# Patient Record
(demographics unavailable — no encounter records)

---
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
## 2025-01-26 NOTE — DATA REVIEWED
[MRI] : MRI [Lumbar Spine] : lumbar spine [Report was reviewed and noted in the chart] : The report was reviewed and noted in the chart [I independently reviewed and interpreted images and report] : I independently reviewed and interpreted images and report [FreeTextEntry1] : MRI @ [facility] - foraminal stenosis at L4-5-S1
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
## 2025-01-26 NOTE — ASSESSMENT
[FreeTextEntry1] : 57 yo M with cervical radiculopathy and lumbar pain without radiculopathy x years, worsening over the last few months without injury. XRs cervical spine without fractures, lesions or instability. Per patient hx of cervical HNP. MRI LS with some foraminal stenosis. no focal pathology, no surgery anticipated  - Start PT - refer to pm&r for continued nonsurgical care

## 2025-01-26 NOTE — IMAGING
[Straightening consistent with spasm] : Straightening consistent with spasm [de-identified] : CSPINE Palpation: no paraspinal tenderness, no spasm in traps, rhomboids, paracervicals ROM: pain when turning head side to side Motor: no focal deficit  Strength: 5/5 bilateral deltoid, biceps, triceps, wrist flexors, wrist extensors, , abductors Sensation I LT  Reflexes: Negative Del Rosario's bilaterally - Spurling  LSPINE Inspection: no defects, deformity Palpation: No tenderness or spasm in bilateral and lumbar paraspinal musculature ROM: Pain with all motion due to stiffness/diminished all planes Motor: no focal deficit  Strength: 5/5 bilateral hip flexors, knee extensors, ankle dorsiflexors, EHL, ankle plantarflexors Sensation I LT  - SLR B/L Tight hamstrings Toe and heal walking intact  Gait non antalgic, non myelopathic

## 2025-01-26 NOTE — IMAGING
[Straightening consistent with spasm] : Straightening consistent with spasm [de-identified] : CSPINE Palpation: no paraspinal tenderness, no spasm in traps, rhomboids, paracervicals ROM: pain when turning head side to side Motor: no focal deficit  Strength: 5/5 bilateral deltoid, biceps, triceps, wrist flexors, wrist extensors, , abductors Sensation I LT  Reflexes: Negative Del Rosario's bilaterally - Spurling  LSPINE Inspection: no defects, deformity Palpation: No tenderness or spasm in bilateral and lumbar paraspinal musculature ROM: Pain with all motion due to stiffness/diminished all planes Motor: no focal deficit  Strength: 5/5 bilateral hip flexors, knee extensors, ankle dorsiflexors, EHL, ankle plantarflexors Sensation I LT  - SLR B/L Tight hamstrings Toe and heal walking intact  Gait non antalgic, non myelopathic

## 2025-02-10 NOTE — DISCUSSION/SUMMARY
[de-identified] : SONYA RIBEIRO is a 58 year-old man presenting for a NPV for a history of chronic neck and low back pain.   Prior treatment: Physical therapy Tramadol Tizanidine Heat, rest, ice  Plan: 1) MRI cervical/lumbar spine images reviewed with the patient. 2) Schedule C7-T1 MADHURI w/o MAC. The procedure was explained to the patient in detail. Reviewed risks, benefits, and alternatives with the patient. Some risks discussed included temporary increase in pain, bleeding, infection, and side effects from steroids. The patient expressed understanding and would like to proceed. 3) Consider L5-S1 ILESI w/o MAC in the future 4) Continue tramadol prn through PCP - patient advised that we do not prescribe opioids 5) RTC post procedure

## 2025-02-10 NOTE — DATA REVIEWED
[Lumbar Spine] : lumbar spine [MRI] : MRI [Cervical Spine] : cervical spine [Report was reviewed and noted in the chart] : The report was reviewed and noted in the chart [I independently reviewed and interpreted images and report] : I independently reviewed and interpreted images and report [FreeTextEntry1] : 1/4/2025 MRI Lumbar Spine ([facility]) INTERPRETATION: MRI of the thoracic spine was performed on 02/18/2015. T12-L1 - L5-S1 disc degenerative changes are noted with loss of disc space height and signal,  anterior hypertrophic change, and anterior disc extension with Modic type II endplate signal  changes noted at L5-S1. Modic type II endplate signal changes are also suggested anteriorly at  L1-2. At L5-S1, disc herniation is noted with grade 1 retrolisthesis deforming the anterior epidural fat  abutting the proximal right S1 nerve root approaching the proximal left S1 nerve root with  bilateral neural foraminal extension abutting the exiting L5 nerve roots. At L4-5, disc herniation is noted deforming the thecal sac abutting the proximal L5 nerve roots  bilaterally with bilateral neural foraminal extension, right greater than left, abutting the exiting  right L4 nerve root. At L3-4, disc bulge is noted with bilateral inferior neural foraminal extension. At L2-3, disc bulge is noted with bilateral inferior neural foraminal extension. At L1-2, there is no evidence of herniated disc, midline thecal sac deformity, or neural foraminal  stenosis. At T12-L1, there is no evidence of herniated disc, spinal canal compromise, or neural foraminal  stenosis.  There is no evidence of lumbar vertebral body compression fracture. There is no evidence of  bone marrow infiltrative disorder. There is no evidence of spondylolisthesis. There is no  evidence of signal elevation within the conus medullaris which is located at the approximate  T12-L1 disc space level. Lumbar spine curvature is noted with leftward convexity. Nonspecific dependent soft tissue edema is noted within the posterior subcutaneous tissues. Examination is compared to previous MRI study of the lumbar spine dated 10/14/2022. There is  no significant interval change. IMPRESSION: - L4-5 and L5-S1 disc herniations with grade 1 retrolisthesis deforming the anterior  epidural fat abutting the proximal right S1 nerve root approaching the proximal left S1  nerve root, L4-5 abutment of the proximal L5 nerve roots bilaterally, L4-5 and L5-S1  bilateral neural foraminal extension abutting the exiting L5 nerve roots bilaterally at L5- S1 and abutting the exiting right L4 nerve root at L4-5. - L2-3 and L3-4 disc bulges with bilateral inferior neural foraminal extension. - Lumbar spine curvature with leftward convexity. - T12-L1 - L5-S1 disc degenerative changes. - No significant change compared to previous MRI study dated 10/14/2022.  10/28/2022 MRI Cervical Spine ([facility]) INTERPRETATION: At the C2-3 disc space level, a disc bulge is noted deforming the thecal  sac, contributing to mild central spinal stenosis in conjunction with posterior ligamentous  hypertrophy. There is no evidence of neural foraminal stenosis. There is loss of disc signal with  preservation of disc space height. At C3-4, disc herniation is noted deforming the thecal sac, contributing to moderate central spinal stenosis in conjunction with posterior ligamentous hypertrophy with bilateral neural  foraminal narrowing in conjunction with facet and uncinate hypertrophic changes. There is loss  of disc signal with preservation of disc space height. At C4-5, a disc bulge is noted deforming the thecal sac. Bilateral neural foraminal narrowing is  noted in conjunction with facet and uncinate hypertrophic changes. There is loss of disc signal  with preservation of disc space height. At C5-6, disc herniation is noted deforming the thecal sac. Bilateral neural foraminal narrowing  is noted in conjunction with facet and uncinate hypertrophic changes. There is loss of disc signal  with preservation of disc space height. At C6-7, disc herniation is noted deforming the thecal sac, abutting the spinal cord. Bilateral  neural foraminal narrowing is noted in conjunction with facet and uncinate hypertrophic  changes. There is loss of disc signal with preservation of disc space height with anterior disc  extension. At C7-T1, a left paracentral disc bulge is noted deforming the thecal sac. There is no evidence of  neural foraminal stenosis. Loss of disc signal is noted with preservation of disc space height. There is only limited assessment provided of the T1-2 - T3-4 disc space levels at the peripheral  margin of the included field of view. MRI study of the thoracic spine was previously performed  on 02/18/2015. Cervical spine straightening is noted, a nonspecific finding which meets criteria for muscle  spasm. C3 - C7 chronic wedge compression deformities are noted with approximately 50% loss of  height at C6 and approximately 20% loss of height at C3, C4, C5, and C7. There is no evidence of spondylolisthesis. There is no evidence of mass at the craniocervical  junction. There is no atlantoaxial subluxation. There is no prevertebral soft tissue edema. There  is no syringohydromyelia. Hemangioma is noted within the C7 vertebral body. Examination is compared to the previous MRI study dated 02/16/2015. C4-5 bilateral neural  foraminal narrowing represents interval development compared to the prior study. IMPRESSION: - C3-4, C5-6, and C6-7 disc herniations deforming the thecal sac, with C6-7 cord  abutment; C3-4, C5-6, and C6-7 bilateral neural foraminal narrowing in conjunction with facet and uncinate hypertrophic changes; and C3-4 moderate central spinal stenosis in  conjunction with posterior ligamentous hypertrophy. - C2-3, C4-5, and C7-T1 disc bulges with C7-T1 left paracentral orientation, C4-5 bilateral  neural foraminal narrowing in conjunction with facet and uncinate hypertrophic changes,  and C2-3 mild central spinal stenosis in conjunction with posterior ligamentous  hypertrophy. - Cervical spine straightening. - C3 - C7 chronic wedge compression deformities.

## 2025-02-10 NOTE — DISCUSSION/SUMMARY
[de-identified] : SONYA RIBEIOR is a 58 year-old man presenting for a NPV for a history of chronic neck and low back pain.   Prior treatment: Physical therapy Tramadol Tizanidine Heat, rest, ice  Plan: 1) MRI cervical/lumbar spine images reviewed with the patient. 2) Schedule C7-T1 MADHURI w/o MAC. The procedure was explained to the patient in detail. Reviewed risks, benefits, and alternatives with the patient. Some risks discussed included temporary increase in pain, bleeding, infection, and side effects from steroids. The patient expressed understanding and would like to proceed. 3) Consider L5-S1 ILESI w/o MAC in the future 4) Continue tramadol prn through PCP - patient advised that we do not prescribe opioids 5) RTC post procedure

## 2025-02-10 NOTE — HISTORY OF PRESENT ILLNESS
[Neck] : neck [Upper back] : upper back [Mid-back] : mid-back [Lower back] : lower back [Right Leg] : right leg [9] : 9 [6] : 6 [Burning] : burning [Dull/Aching] : dull/aching [Radiating] : radiating [Sharp] : sharp [Shooting] : shooting [Stabbing] : stabbing [Throbbing] : throbbing [Tightness] : tightness [Tingling] : tingling [Constant] : constant [Household chores] : household chores [Leisure] : leisure [Sleep] : sleep [Meds] : meds [Sitting] : sitting [Standing] : standing [Walking] : walking [Physical therapy] : physical therapy [] : no [FreeTextEntry1] : Bilateral shoulders  [FreeTextEntry6] : numbness and tingling in r leg  [FreeTextEntry7] : r leg  [FreeTextEntry9] : Tramadol helps with some relief  [de-identified] : x-ray and MRI @ [facility]

## 2025-02-10 NOTE — HISTORY OF PRESENT ILLNESS
[Neck] : neck [Upper back] : upper back [Mid-back] : mid-back [Lower back] : lower back [Right Leg] : right leg [9] : 9 [6] : 6 [Burning] : burning [Dull/Aching] : dull/aching [Radiating] : radiating [Sharp] : sharp [Shooting] : shooting [Stabbing] : stabbing [Throbbing] : throbbing [Tightness] : tightness [Tingling] : tingling [Constant] : constant [Household chores] : household chores [Leisure] : leisure [Sleep] : sleep [Meds] : meds [Sitting] : sitting [Standing] : standing [Walking] : walking [Physical therapy] : physical therapy [] : no [FreeTextEntry1] : Bilateral shoulders  [FreeTextEntry6] : numbness and tingling in r leg  [FreeTextEntry7] : r leg  [FreeTextEntry9] : Tramadol helps with some relief  [de-identified] : x-ray and MRI @ [facility]

## 2025-02-10 NOTE — PHYSICAL EXAM
[de-identified] : Gen: NAD Neck: +limited extension and flexion; +spurling's on the LEFT Head: NC/AT Eyes: no glasses, no scleral icterus ENT: mucous membranes moist CV: RRR, S1 S2, no mrg Lungs: CTAB, nonlabored breathing Abd: soft, NT/ND Ext: full ROM in all extremities, no peripheral edema Back: +TTP in the bilateral low lumbar facet region, +SLR bilaterally Neuro: CN intact UEs +5 L +5 R shoulder abduction +5 L +5 R arm abduction +5 L +5 R forearm flexion +5 L +5 R forearm extension +5 L +5 R finger flexion +5 L +5 R  strength LEs +5 L +5 R hip flexion +5 L +5 R leg extension +5 L +5 R leg flexion +5 L +5 R foot dorsiflexion +5 L +5 R foot plantarflexion +5 L +5 R EHL extension Psych: normal affect Skin: no visible lesions

## 2025-02-10 NOTE — PHYSICAL EXAM
[de-identified] : Gen: NAD Neck: +limited extension and flexion; +spurling's on the LEFT Head: NC/AT Eyes: no glasses, no scleral icterus ENT: mucous membranes moist CV: RRR, S1 S2, no mrg Lungs: CTAB, nonlabored breathing Abd: soft, NT/ND Ext: full ROM in all extremities, no peripheral edema Back: +TTP in the bilateral low lumbar facet region, +SLR bilaterally Neuro: CN intact UEs +5 L +5 R shoulder abduction +5 L +5 R arm abduction +5 L +5 R forearm flexion +5 L +5 R forearm extension +5 L +5 R finger flexion +5 L +5 R  strength LEs +5 L +5 R hip flexion +5 L +5 R leg extension +5 L +5 R leg flexion +5 L +5 R foot dorsiflexion +5 L +5 R foot plantarflexion +5 L +5 R EHL extension Psych: normal affect Skin: no visible lesions

## 2025-03-11 NOTE — PROCEDURE
[FreeTextEntry1] : C7-T1 cervical interlaminar epidural steroid injection [FreeTextEntry2] : chronic cervical radiculopathy [FreeTextEntry3] : Procedure Date: 03/11/2025   Preoperative Diagnosis: cervical radiculopathy   Procedure: C7-T1 epidural steroid injection under fluoroscopic guidance   Anesthesia: local   Complications: none   EBL: none   Procedure in detail: Patient was seen and examined. Risks, benefits, and alternatives for the procedure were discussed with the patient in detail. The patient expressed understanding, gave written and verbal consent, and placed themselves in a prone position on the procedure table. Skin overlying the posterior cervical spine was prepped with chloraprep and draped in the usual sterile fashion. Fluoroscopic images were obtained to identify the C7 and T1 vertebral body. Target was the interlaminar space between the C7 and T1 vertebral body. Skin overlying the target was marked and infiltrated with 1% lidocaine. Using an 20 gauge, 4.5 inch tuohy needle, this was inserted and advanced under intermittent fluoroscopic guidance. When felt to be engaged in the ligamentum flavum, contralateral oblique view was used to confirm depth. Needle then advanced using loss of resistance to saline technique until loss was achieved. After negative aspiration for heme/csf, contrast was injected under live fluoroscopy, which showed contrast spread consistent with epidural flow. No evidence of intravascular or intrathecal uptake. At this point, a total of 2ml of injectate, which consisted of 1ml of 80mg/ml depomedrol and 1ml of normal saline was injected. The needle was restyletted and withdrawn, a band aid was placed over the injection site. Patient tolerated the procedure well. The patient recovered uneventfully and was discharged home in stable condition.

## 2025-04-02 NOTE — DISCUSSION/SUMMARY
[de-identified] : SONYA RIBEIRO is a 59 year-old man presenting for a RPV for a history of chronic neck and low back pain.   Prior treatment: 3/11/2025: C7-T1 MADHURI w/o MAC  Physical therapy Tramadol Tizanidine Heat, rest, ice  Plan: 1) MRI cervical/lumbar spine images reviewed with the patient. 2) Consider repeat C7-T1 MADHURI w/o MAC in the future 3) Consider L5-S1 ILESI w/o MAC in the future 4) Continue tramadol prn through PCP - patient advised that we do not prescribe opioids 5) RTC

## 2025-04-02 NOTE — PHYSICAL EXAM
[de-identified] : Gen: NAD Neck: +limited extension and flexion; +spurling's on the LEFT Head: NC/AT Eyes: no glasses, no scleral icterus ENT: mucous membranes moist CV: RRR, S1 S2, no mrg Lungs: CTAB, nonlabored breathing Abd: soft, NT/ND Ext: full ROM in all extremities, no peripheral edema Back: +TTP in the bilateral low lumbar facet region, +SLR bilaterally Neuro: CN intact UEs +5 L +5 R shoulder abduction +5 L +5 R arm abduction +5 L +5 R forearm flexion +5 L +5 R forearm extension +5 L +5 R finger flexion +5 L +5 R  strength LEs +5 L +5 R hip flexion +5 L +5 R leg extension +5 L +5 R leg flexion +5 L +5 R foot dorsiflexion +5 L +5 R foot plantarflexion +5 L +5 R EHL extension Psych: normal affect Skin: no visible lesions

## 2025-04-02 NOTE — HISTORY OF PRESENT ILLNESS
[Neck] : neck [Upper back] : upper back [Mid-back] : mid-back [Lower back] : lower back [Right Leg] : right leg [9] : 9 [6] : 6 [Burning] : burning [Dull/Aching] : dull/aching [Radiating] : radiating [Sharp] : sharp [Shooting] : shooting [Stabbing] : stabbing [Throbbing] : throbbing [Tightness] : tightness [Tingling] : tingling [Constant] : constant [Household chores] : household chores [Leisure] : leisure [Sleep] : sleep [Meds] : meds [Sitting] : sitting [Standing] : standing [Walking] : walking [Physical therapy] : physical therapy [] : no [FreeTextEntry1] : Bilateral shoulders  [FreeTextEntry6] : numbness and tingling in r leg  [FreeTextEntry7] : r leg  [FreeTextEntry9] : Tramadol helps with some relief  [de-identified] : x-ray and MRI @ [facility]